# Patient Record
Sex: FEMALE | Race: WHITE | URBAN - METROPOLITAN AREA
[De-identification: names, ages, dates, MRNs, and addresses within clinical notes are randomized per-mention and may not be internally consistent; named-entity substitution may affect disease eponyms.]

---

## 2017-05-10 ENCOUNTER — OFFICE VISIT (OUTPATIENT)
Dept: URGENT CARE | Facility: URGENT CARE | Age: 27
End: 2017-05-10
Payer: COMMERCIAL

## 2017-05-10 VITALS
HEART RATE: 82 BPM | WEIGHT: 244.8 LBS | TEMPERATURE: 96.4 F | SYSTOLIC BLOOD PRESSURE: 116 MMHG | OXYGEN SATURATION: 98 % | DIASTOLIC BLOOD PRESSURE: 70 MMHG

## 2017-05-10 DIAGNOSIS — N92.0 EXCESSIVE OR FREQUENT MENSTRUATION: Primary | ICD-10-CM

## 2017-05-10 PROCEDURE — 99207 ZZC NO BILLABLE SERVICE THIS VISIT: CPT | Performed by: FAMILY MEDICINE

## 2017-05-10 NOTE — NURSING NOTE
Chief Complaint   Patient presents with     Urgent Care     Vaginal Problem     Pt states has perioid for about 8 days but has a really heavy period. Pt states no painful cramping.        Initial /70 (BP Location: Right arm, Patient Position: Chair, Cuff Size: Adult Regular)  Pulse 82  Temp 96.4  F (35.8  C) (Tympanic)  Wt 244 lb 12.8 oz (111 kg)  LMP 05/06/2017  SpO2 98% There is no height or weight on file to calculate BMI.  Medication Reconciliation: unable or not appropriate to perform   Asad Pedroza CMA ([facility]MA) 5/10/2017 12:55 PM

## 2017-05-10 NOTE — MR AVS SNAPSHOT
"              After Visit Summary   5/10/2017    Lynette Aguila    MRN: [id number]           Patient Information     Date Of Birth          1990        Visit Information        Provider Department      5/10/2017 12:35 PM Nate Oswald MD [facility]gent [facility]        Today's Diagnoses     Excessive or frequent menstruation    -  1       Follow-ups after your visit        Who to contact     If you have questions or need follow up information about today's clinic visit or your schedule please contact [facility] directly at [phone].  Normal or non-critical lab and imaging results will be communicated to you by [employer], letter or phone within 4 business days after the clinic has received the results. If you do not hear from us within 7 days, please contact the clinic through [employer] or phone. If you have a critical or abnormal lab result, we will notify you by phone as soon as possible.  Submit refill requests through [employer] or call your pharmacy and they will forward the refill request to us. Please allow 3 business days for your refill to be completed.          Additional Information About Your Visit        MyChart Information     [employer] lets you send messages to your doctor, view your test results, renew your prescriptions, schedule appointments and more. To sign up, go to [email or web address] . Click on \"Log in\" on the left side of the screen, which will take you to the Welcome page. Then click on \"Sign up Now\" on the right side of the page.     You will be asked to enter the access code listed below, as well as some personal information. Please follow the directions to create your username and password.     Your access code is: [email or web address]  Expires: 2017  2:05 PM     Your access code will  in 90 days. If you need help or a new code, please call your [place] clinic or [phone].        Care EveryWhere ID     This is your Care EveryWhere ID. This could be used by other " organizations to access your [place] medical records  [id number]        Your Vitals Were     Pulse Temperature Last Period Pulse Oximetry          82 96.4  F (35.8  C) (Tympanic) 05/06/2017 98%         Blood Pressure from Last 3 Encounters:   05/10/17 116/70    Weight from Last 3 Encounters:   05/10/17 244 lb 12.8 oz (111 kg)              Today, you had the following     No orders found for display       Primary Care Provider Office Phone # Fax #    León Huffman [phone]       [address] [place] [id number]        Thank you!     Thank you for choosing [facility]  for your care. Our goal is always to provide you with excellent care. Hearing back from our patients is one way we can continue to improve our services. Please take a few minutes to complete the written survey that you may receive in the mail after your visit with us. Thank you!             Your Updated Medication List - Protect others around you: Learn how to safely use, store and throw away your medicines at www.disposemymeds.org.          This list is accurate as of: 5/10/17  2:05 PM.  Always use your most recent med list.                   Brand Name Dispense Instructions for use    PLAQUENIL PO          PREDNISONE PO          VITAMIN E PO

## 2017-05-10 NOTE — PROGRESS NOTES
THIS IS A TRIAGE ENCOUNTER    Patient is a 27-year-old female who goes to an [facility] clinic in [place], [place].  Patient has been experiencing heavy vaginal bleeding (patient is going through one pad every hour) since today.  Today is day #8 of her menstrual period.  Patient had a similar problem nine months ago.  Patient was evaluated at the [facility] emergency room.  Patient's blood test revealed very low platelet counts.  Patient did not receive any treatment for this low platelet count.  Patient does not have a regular gynecologist.  Patient denies being lightheaded / short of breath.  No painful cramps.  BP is 116/70, pulse 82, Oxygen Saturation 98%, and temp is 96.4.   Patient does not want to go to a [facility] nor go to a [facility], since she prefers to return to [place] and because of transportation logistics.  Patient will go to the emergency room in [place] at the [facility] today for further evaluation. Patient's dad will pick the patient up, but first the patient has to return her mother's car.     I told the patient that she would not be charged for this triage evaluation.     Nate Oswald MD